# Patient Record
Sex: MALE | Race: WHITE | NOT HISPANIC OR LATINO
[De-identification: names, ages, dates, MRNs, and addresses within clinical notes are randomized per-mention and may not be internally consistent; named-entity substitution may affect disease eponyms.]

---

## 2017-11-01 ENCOUNTER — TRANSCRIPTION ENCOUNTER (OUTPATIENT)
Age: 52
End: 2017-11-01

## 2018-06-26 ENCOUNTER — OUTPATIENT (OUTPATIENT)
Dept: OUTPATIENT SERVICES | Facility: HOSPITAL | Age: 53
LOS: 1 days | End: 2018-06-26

## 2018-09-10 PROBLEM — Z00.00 ENCOUNTER FOR PREVENTIVE HEALTH EXAMINATION: Status: ACTIVE | Noted: 2018-09-10

## 2018-09-14 ENCOUNTER — APPOINTMENT (OUTPATIENT)
Dept: CARDIOLOGY | Facility: CLINIC | Age: 53
End: 2018-09-14
Payer: MEDICARE

## 2018-09-14 VITALS
HEIGHT: 71 IN | DIASTOLIC BLOOD PRESSURE: 80 MMHG | OXYGEN SATURATION: 99 % | BODY MASS INDEX: 20.86 KG/M2 | HEART RATE: 62 BPM | WEIGHT: 149 LBS | SYSTOLIC BLOOD PRESSURE: 140 MMHG

## 2018-09-14 DIAGNOSIS — Z87.898 PERSONAL HISTORY OF OTHER SPECIFIED CONDITIONS: ICD-10-CM

## 2018-09-14 DIAGNOSIS — M62.830 MUSCLE SPASM OF BACK: ICD-10-CM

## 2018-09-14 DIAGNOSIS — Z80.1 FAMILY HISTORY OF MALIGNANT NEOPLASM OF TRACHEA, BRONCHUS AND LUNG: ICD-10-CM

## 2018-09-14 DIAGNOSIS — Z82.49 FAMILY HISTORY OF ISCHEMIC HEART DISEASE AND OTHER DISEASES OF THE CIRCULATORY SYSTEM: ICD-10-CM

## 2018-09-14 DIAGNOSIS — M79.605 PAIN IN LEFT LEG: ICD-10-CM

## 2018-09-14 DIAGNOSIS — F43.9 REACTION TO SEVERE STRESS, UNSPECIFIED: ICD-10-CM

## 2018-09-14 DIAGNOSIS — Z87.828 PERSONAL HISTORY OF OTHER (HEALED) PHYSICAL INJURY AND TRAUMA: ICD-10-CM

## 2018-09-14 DIAGNOSIS — M79.606 PAIN IN LEG, UNSPECIFIED: ICD-10-CM

## 2018-09-14 DIAGNOSIS — F17.200 NICOTINE DEPENDENCE, UNSPECIFIED, UNCOMPLICATED: ICD-10-CM

## 2018-09-14 PROCEDURE — 99203 OFFICE O/P NEW LOW 30 MIN: CPT

## 2018-09-14 RX ORDER — FENTANYL 50 UG/H
50 PATCH, EXTENDED RELEASE TRANSDERMAL
Refills: 0 | Status: ACTIVE | COMMUNITY

## 2018-10-22 ENCOUNTER — APPOINTMENT (OUTPATIENT)
Dept: CARDIOLOGY | Facility: CLINIC | Age: 53
End: 2018-10-22
Payer: MEDICARE

## 2018-10-22 DIAGNOSIS — Z82.49 FAMILY HISTORY OF ISCHEMIC HEART DISEASE AND OTHER DISEASES OF THE CIRCULATORY SYSTEM: ICD-10-CM

## 2018-10-22 DIAGNOSIS — Z83.438 FAMILY HISTORY OF OTHER DISORDER OF LIPOPROTEIN METABOLISM AND OTHER LIPIDEMIA: ICD-10-CM

## 2018-10-22 DIAGNOSIS — Z78.9 OTHER SPECIFIED HEALTH STATUS: ICD-10-CM

## 2018-10-22 DIAGNOSIS — Z86.39 PERSONAL HISTORY OF OTHER ENDOCRINE, NUTRITIONAL AND METABOLIC DISEASE: ICD-10-CM

## 2018-10-22 DIAGNOSIS — Z87.09 PERSONAL HISTORY OF OTHER DISEASES OF THE RESPIRATORY SYSTEM: ICD-10-CM

## 2018-10-22 DIAGNOSIS — Z86.69 PERSONAL HISTORY OF OTHER DISEASES OF THE NERVOUS SYSTEM AND SENSE ORGANS: ICD-10-CM

## 2018-10-22 PROCEDURE — 93015 CV STRESS TEST SUPVJ I&R: CPT

## 2018-10-22 PROCEDURE — 93979 VASCULAR STUDY: CPT

## 2018-10-22 PROCEDURE — 93306 TTE W/DOPPLER COMPLETE: CPT

## 2018-10-22 PROCEDURE — A9502: CPT

## 2018-10-22 PROCEDURE — 78452 HT MUSCLE IMAGE SPECT MULT: CPT

## 2018-10-22 PROCEDURE — 93880 EXTRACRANIAL BILAT STUDY: CPT

## 2018-10-22 RX ORDER — ALBUTEROL SULFATE 90 UG/1
108 (90 BASE) AEROSOL, METERED RESPIRATORY (INHALATION)
Refills: 0 | Status: ACTIVE | COMMUNITY

## 2018-10-22 RX ORDER — HYDROCODONE BITARTRATE AND ACETAMINOPHEN 10; 325 MG/1; MG/1
10-325 TABLET ORAL
Refills: 0 | Status: ACTIVE | COMMUNITY

## 2018-10-23 ENCOUNTER — APPOINTMENT (OUTPATIENT)
Dept: CARDIOLOGY | Facility: CLINIC | Age: 53
End: 2018-10-23

## 2018-11-02 ENCOUNTER — APPOINTMENT (OUTPATIENT)
Dept: CARDIOLOGY | Facility: CLINIC | Age: 53
End: 2018-11-02
Payer: MEDICARE

## 2018-11-02 VITALS
HEART RATE: 68 BPM | SYSTOLIC BLOOD PRESSURE: 126 MMHG | WEIGHT: 149 LBS | BODY MASS INDEX: 20.86 KG/M2 | DIASTOLIC BLOOD PRESSURE: 90 MMHG | HEIGHT: 71 IN | OXYGEN SATURATION: 98 %

## 2018-11-02 PROCEDURE — 99214 OFFICE O/P EST MOD 30 MIN: CPT

## 2019-04-16 ENCOUNTER — RX RENEWAL (OUTPATIENT)
Age: 54
End: 2019-04-16

## 2019-05-10 ENCOUNTER — APPOINTMENT (OUTPATIENT)
Dept: CARDIOLOGY | Facility: CLINIC | Age: 54
End: 2019-05-10
Payer: MEDICARE

## 2019-05-10 VITALS
HEIGHT: 71 IN | DIASTOLIC BLOOD PRESSURE: 80 MMHG | SYSTOLIC BLOOD PRESSURE: 122 MMHG | BODY MASS INDEX: 21.84 KG/M2 | HEART RATE: 61 BPM | WEIGHT: 156 LBS

## 2019-05-10 PROCEDURE — 99214 OFFICE O/P EST MOD 30 MIN: CPT

## 2019-05-10 RX ORDER — METOPROLOL SUCCINATE 100 MG/1
100 TABLET, EXTENDED RELEASE ORAL DAILY
Refills: 0 | Status: DISCONTINUED | COMMUNITY
End: 2019-05-10

## 2019-05-10 NOTE — REASON FOR VISIT
[Consultation] : a consultation regarding [FreeTextEntry2] : hx atypical chest pain, abnormal EKG, DUMONT, tobacco, HTN, dyslipidemia, ascending aortic aneurysm 4.5cm on echo  [FreeTextEntry1] : Leonid is a 53-year-old male with history of hypertension, dyslipidemia, tobacco addiction, anxiety, headache, chronic back pain, family history premature CAD, ascending aortic aneurysm 4.5cm on echocardiogram.\par \par Patient has recurrent back pain and atypical chest pain, sharp, moderate intensity, lasting several minutes with upper body activity.  Patient has dyspnea with moderate exertion.  Patient has occasional palpitations and dizziness.  Cardiovascular review of symptoms is negative for exertional chest pain,  syncope.  No PND or orthopnea leg edema.  No bleeding or black stool. No history of CAD, MI, CHF, TIA, CVA, diabetes, PVD, DVT, PE, arrhythmia, Afib.\par \par Patient is not exercising.  Patient is walking less 15 minutes without exertional chest pain.  Average home blood pressures areat BP goal <130/80.\par \par Patient change to smoke 4 cigarettes per day. Smoking cessation has been discussed at length, patient will make an effort to reduce smoking and quit.\par \par Patient has significant stress at home taking care of his 28 year old son with autism and is elderly in-laws living down the street from his home. \par \par Lexascan Myoview stress test, October 2018, LVEF 52%, normal perfusion, diaphragm attenuation seen, no ischemic EKG response, baseline sinus rhythm and assist, hypertensive at baseline with continued hypertension and facial flushing. Norvasc 5mg daily added  with followup home BP. \par \par 2-D echo October 2018, LVEF 60%, mild MR, sigmoid septum 1.5cm, ascending aorta 4.5cm\par \par Carotid and abdominal ultrasound October 2018, mild to moderate nonobstructive plaque, normal abdominal aortic size\par \par EKG 6/6/18 done by [facility]D, sinus rhythm RSR V1 T-wave abnormality inferior and anteroseptal leads.

## 2019-05-10 NOTE — REVIEW OF SYSTEMS
[Dyspnea on exertion] : dyspnea during exertion [Shortness Of Breath] : shortness of breath [Heartburn] : heartburn [Joint Pain] : joint pain [Negative] : Heme/Lymph [Chest  Pressure] : no chest pressure [Lower Ext Edema] : no extremity edema [Chest Pain] : no chest pain [Palpitations] : no palpitations [Leg Claudication] : no intermittent leg claudication [FreeTextEntry1] : chronic low back pain limited exercise capacity

## 2019-05-10 NOTE — DISCUSSION/SUMMARY
[FreeTextEntry1] : Leonid is a 53-year-old male with medical history detailed above and active medical issues including:\par \par - Atypical chest pain, normal perfusion Myoview stress test, normal LVEF 2018\par \par - Ascending aortic aneurysm 4.5cm, contrast CTA has been ordered with current labs\par \par - Labile hypertension with anxiety on Vasotec, Toprol and norvasc with average home blood pressure at goal <130/80. \par \par - Dyslipidemia, repeat labs pending on low fat diet\par \par - Tobacco addiction. Smoking cessation has been discussed at length, patient will make an effort to reduce smoking and quit.\par \par - Chronic low back pain limiting exercise capacity.  Pain management epidural therapy\par \par - Family history of premature CAD, mother  age 62 MI\par \par Patient will be seen in cardiology follow-up 6 months with 2-D echocardiogram to assess for  LV systolic function, wall motion and  structural heart disease. Current cardiac medications remain unchanged. \par \par Advised patient to follow active lifestyle with regular cardiovascular exercise. Patient educated on lifestyle and diet modification with low sodium low fat diet and avoidance of excessive alcohol. Patient is aware to call with any symptoms or concerns. Smoking cessation has been discussed at length, patient will make an effort to reduce smoking and quit.\par \par Leonid will followup with Dr Darren Salter for primary care\par \par

## 2019-07-10 ENCOUNTER — OUTPATIENT (OUTPATIENT)
Dept: OUTPATIENT SERVICES | Facility: HOSPITAL | Age: 54
LOS: 1 days | End: 2019-07-10

## 2019-10-16 ENCOUNTER — APPOINTMENT (OUTPATIENT)
Dept: CARDIOLOGY | Facility: CLINIC | Age: 54
End: 2019-10-16

## 2019-11-05 ENCOUNTER — APPOINTMENT (OUTPATIENT)
Dept: CARDIOLOGY | Facility: CLINIC | Age: 54
End: 2019-11-05

## 2019-12-23 ENCOUNTER — RX RENEWAL (OUTPATIENT)
Age: 54
End: 2019-12-23

## 2019-12-23 ENCOUNTER — MEDICATION RENEWAL (OUTPATIENT)
Age: 54
End: 2019-12-23

## 2020-01-24 ENCOUNTER — NON-APPOINTMENT (OUTPATIENT)
Age: 55
End: 2020-01-24

## 2020-01-24 ENCOUNTER — APPOINTMENT (OUTPATIENT)
Dept: CARDIOLOGY | Facility: CLINIC | Age: 55
End: 2020-01-24
Payer: MEDICARE

## 2020-01-24 VITALS
BODY MASS INDEX: 21.7 KG/M2 | OXYGEN SATURATION: 98 % | HEIGHT: 71 IN | SYSTOLIC BLOOD PRESSURE: 112 MMHG | WEIGHT: 155 LBS | HEART RATE: 61 BPM | DIASTOLIC BLOOD PRESSURE: 66 MMHG

## 2020-01-24 DIAGNOSIS — R20.2 ANESTHESIA OF SKIN: ICD-10-CM

## 2020-01-24 DIAGNOSIS — R20.0 ANESTHESIA OF SKIN: ICD-10-CM

## 2020-01-24 PROCEDURE — 93000 ELECTROCARDIOGRAM COMPLETE: CPT

## 2020-01-24 PROCEDURE — 99214 OFFICE O/P EST MOD 30 MIN: CPT

## 2020-01-24 RX ORDER — ESOMEPRAZOLE MAGNESIUM 20 MG/1
20 CAPSULE, DELAYED RELEASE ORAL
Refills: 0 | Status: DISCONTINUED | COMMUNITY
End: 2020-01-24

## 2020-01-24 NOTE — REVIEW OF SYSTEMS
[Shortness Of Breath] : shortness of breath [Dyspnea on exertion] : dyspnea during exertion [Heartburn] : heartburn [Joint Pain] : joint pain [Negative] : Endocrine [Chest  Pressure] : no chest pressure [Chest Pain] : no chest pain [Leg Claudication] : no intermittent leg claudication [Lower Ext Edema] : no extremity edema [FreeTextEntry1] : chronic low back pain limited exercise capacity [Palpitations] : no palpitations

## 2020-01-24 NOTE — REASON FOR VISIT
[Consultation] : a consultation regarding [FreeTextEntry2] : atypical chest pain, abnormal EKG, DUMONT, tobacco, HTN, dyslipidemia, AsAA 4.5cm on echo normal CTA  [FreeTextEntry1] : Leonid is a 54-year-old male with history of hypertension, dyslipidemia, tobacco addiction, anxiety, headache, chronic back pain, family history premature CAD, ascending aortic aneurysm 4.5cm on echocardiogram with normal aorta on CTA.\par \par Patient has recurrent back pain and atypical chest pain, sharp, moderate intensity, lasting several minutes with upper body activity.  Patient has dyspnea with moderate exertion.  Patient has occasional palpitations and dizziness.  Cardiovascular review of symptoms is negative for exertional chest pain,  syncope.  No PND or orthopnea leg edema.  No bleeding or black stool. No history of CAD, MI, CHF, TIA, CVA, diabetes, PVD, DVT, PE, arrhythmia, Afib.\par \par Patient is not exercising.  Patient is walking less 15 minutes without exertional chest pain.  Average home blood pressures areat BP goal <130/80.\par \par Patient change to smoke 4 cigarettes per day. Smoking cessation has been discussed at length, patient will make an effort to reduce smoking and quit.\par \par Patient has significant stress at home taking care of his 28 year old son with autism and is elderly in-laws living down the street from his home. \par \par Lexascan Myoview stress test, October 2018, LVEF 52%, normal perfusion, diaphragm attenuation seen, no ischemic EKG response, baseline sinus rhythm and assist, hypertensive at baseline with continued hypertension and facial flushing. Norvasc 5mg daily added  with followup home BP. \par \par 2-D echo October 2018, LVEF 60%, mild MR, sigmoid septum 1.5cm, ascending aorta 4.5cm\par \par Contrast CTA chest May 2019, no evidence of aortic aneurysm\par \par Carotid and abdominal ultrasound October 2018, mild to moderate nonobstructive plaque, normal abdominal aortic size\par \par EKG 6/6/18 done by [facility], sinus rhythm RSR V1 T-wave abnormality inferior and anteroseptal leads.

## 2020-01-24 NOTE — DISCUSSION/SUMMARY
[FreeTextEntry1] : Leonid is a 53-year-old male with medical history detailed above and active medical issues including:\par \par - Atypical chest pain resolved, normal perfusion Myoview stress test, normal LVEF 2018\par \par - Ascending aortic aneurysm 4.5cm, contrast CTA chest May 2019 no evidence of aortic aneurysm\par \par - Labile hypertension with anxiety on Vasotec, Toprol and norvasc with average home blood pressure at goal <130/80. \par \par - Dyslipidemia, repeat labs pending on low fat diet\par \par - Tobacco addiction. Smoking cessation has been discussed at length, patient will make an effort to reduce smoking and quit.\par \par - Chronic low back pain limiting exercise capacity.  Pain management epidural therapy\par \par - Family history of premature CAD, mother  age 62 MI\par \par Patient will be seen in cardiology follow-up 6 months with 2-D echocardiogram to assess for  LV systolic function, wall motion and  structural heart disease.  Carotid and abdominal ultrasound to assess for obstructive PAD.  Current cardiac medications remain unchanged. \par \par Advised patient to follow active lifestyle with regular cardiovascular exercise. Patient educated on lifestyle and diet modification with low sodium low fat diet and avoidance of excessive alcohol. Patient is aware to call with any symptoms or concerns. Smoking cessation has been discussed at length, patient will make an effort to reduce smoking and quit.\par \par Leonid will followup with Renan Barron NP for primary care\par \par

## 2020-02-12 ENCOUNTER — APPOINTMENT (OUTPATIENT)
Dept: CARDIOLOGY | Facility: CLINIC | Age: 55
End: 2020-02-12
Payer: MEDICARE

## 2020-02-12 PROCEDURE — 93306 TTE W/DOPPLER COMPLETE: CPT

## 2020-08-28 ENCOUNTER — APPOINTMENT (OUTPATIENT)
Dept: CARDIOLOGY | Facility: CLINIC | Age: 55
End: 2020-08-28
Payer: MEDICARE

## 2020-08-28 VITALS
DIASTOLIC BLOOD PRESSURE: 62 MMHG | BODY MASS INDEX: 21.14 KG/M2 | HEART RATE: 61 BPM | SYSTOLIC BLOOD PRESSURE: 114 MMHG | HEIGHT: 71 IN | OXYGEN SATURATION: 96 % | WEIGHT: 151 LBS

## 2020-08-28 DIAGNOSIS — M62.838 OTHER MUSCLE SPASM: ICD-10-CM

## 2020-08-28 PROCEDURE — 99214 OFFICE O/P EST MOD 30 MIN: CPT

## 2020-08-28 NOTE — REASON FOR VISIT
[Consultation] : a consultation regarding [FreeTextEntry2] : atypical chest pain, abnormal EKG, DUMONT, tobacco, HTN, dyslipidemia, AsAA 4.5cm on echo normal CTA  [FreeTextEntry1] : Leonid is a 55-year-old male with history of hypertension, dyslipidemia, tobacco addiction, anxiety, headache, chronic back pain, family history premature CAD, ascending aortic aneurysm 4.5cm on echocardiogram with normal aorta on CTA.\par \par Patient has recurrent back pain and atypical chest pain, sharp, moderate intensity, lasting several minutes with upper body activity.  Patient has dyspnea with moderate exertion unchanged.  Patient has occasional palpitations and dizziness.  Cardiovascular review of symptoms is negative for exertional chest pain,  syncope.  No PND or orthopnea leg edema.  No bleeding or black stool. \par \par No history of CAD, MI, CHF, TIA, CVA, diabetes, PVD, DVT, PE, arrhythmia, Afib.\par \par Patient is not exercising.  Patient is walking less 15 minutes without exertional chest pain.  Average home blood pressures areat BP goal <130/80.\par \par Patient continues to smoke 4 cigarettes per day. Smoking cessation has been discussed at length, patient will make an effort to reduce smoking and quit.\par \par Patient has significant stress at home taking care of his 28 year old son with autism and is elderly in-laws living down the street from his home. \par \par Echocardiogram February 2020, LVEF 60%, mild MR, normal RVSP, ascending aorta 3.5 cm\par \par Lexascan Myoview stress test, October 2018, LVEF 52%, normal perfusion, diaphragm attenuation seen, no ischemic EKG response, baseline sinus rhythm and assist, hypertensive at baseline with continued hypertension and facial flushing. Norvasc 5mg daily added  with followup home BP. \par \par 2-D echo October 2018, LVEF 60%, mild MR, sigmoid septum 1.5cm, ascending aorta 4.5cm\par \par Contrast CTA chest May 2019, no evidence of aortic aneurysm\par \par Carotid and abdominal ultrasound October 2018, mild to moderate nonobstructive plaque, normal abdominal aortic size\par \par EKG 6/6/18 done by [facility], sinus rhythm RSR V1 T-wave abnormality inferior and anteroseptal leads.

## 2020-08-28 NOTE — DISCUSSION/SUMMARY
[FreeTextEntry1] : Leonid is a 55-year-old male with medical history detailed above and active medical issues including:\par \par - Atypical chest pain resolved, normal perfusion Myoview stress test, normal LVEF 2018.  In the setting of Covid 19 pandemic we will discuss the need for stress testing next office visit.\par \par - Ascending aortic aneurysm 4.5cm, contrast CTA chest May 2019 no evidence of aortic aneurysm\par \par - Labile hypertension with anxiety on Vasotec, Toprol and norvasc with average home blood pressure at goal <130/80. \par \par - Dyslipidemia, repeat labs pending on low fat diet\par \par - Tobacco addiction. Smoking cessation has been discussed at length, patient will make an effort to reduce smoking and quit.\par \par - Chronic low back pain limiting exercise capacity.  Pain management epidural therapy\par \par - Family history of premature CAD, mother  age 62 MI\par \par Patient will be seen in cardiology follow-up 6 months with 2-D echocardiogram to assess for  LV systolic function, wall motion and  structural heart disease.  Carotid and abdominal ultrasound to assess for obstructive PAD.  Current cardiac medications remain unchanged. \par \par Advised patient to follow active lifestyle with regular cardiovascular exercise. Patient educated on lifestyle and diet modification with low sodium low fat diet and avoidance of excessive alcohol. Patient is aware to call with any symptoms or concerns. Smoking cessation has been discussed at length, patient will make an effort to reduce smoking and quit.\par \par Leonid will followup with Renan Barron NP for primary care\par \par

## 2020-08-28 NOTE — REVIEW OF SYSTEMS
[Shortness Of Breath] : shortness of breath [Dyspnea on exertion] : dyspnea during exertion [Heartburn] : heartburn [Joint Pain] : joint pain [Negative] : Heme/Lymph [Chest  Pressure] : no chest pressure [Chest Pain] : no chest pain [Lower Ext Edema] : no extremity edema [Leg Claudication] : no intermittent leg claudication [Palpitations] : no palpitations [FreeTextEntry1] : chronic low back pain limited exercise capacity

## 2020-10-12 ENCOUNTER — RX RENEWAL (OUTPATIENT)
Age: 55
End: 2020-10-12

## 2021-02-10 ENCOUNTER — APPOINTMENT (OUTPATIENT)
Dept: CARDIOLOGY | Facility: CLINIC | Age: 56
End: 2021-02-10
Payer: MEDICARE

## 2021-02-10 PROCEDURE — 93306 TTE W/DOPPLER COMPLETE: CPT

## 2021-04-07 ENCOUNTER — RX RENEWAL (OUTPATIENT)
Age: 56
End: 2021-04-07

## 2021-10-31 ENCOUNTER — RX RENEWAL (OUTPATIENT)
Age: 56
End: 2021-10-31

## 2021-11-10 ENCOUNTER — NON-APPOINTMENT (OUTPATIENT)
Age: 56
End: 2021-11-10

## 2021-11-10 ENCOUNTER — APPOINTMENT (OUTPATIENT)
Dept: CARDIOLOGY | Facility: CLINIC | Age: 56
End: 2021-11-10
Payer: MEDICARE

## 2021-11-10 VITALS
TEMPERATURE: 97.4 F | DIASTOLIC BLOOD PRESSURE: 60 MMHG | HEIGHT: 71 IN | HEART RATE: 65 BPM | BODY MASS INDEX: 21 KG/M2 | OXYGEN SATURATION: 95 % | WEIGHT: 150 LBS | SYSTOLIC BLOOD PRESSURE: 114 MMHG

## 2021-11-10 PROCEDURE — 99214 OFFICE O/P EST MOD 30 MIN: CPT

## 2021-11-10 NOTE — REASON FOR VISIT
[Consultation] : a consultation regarding [FreeTextEntry1] : Leonid is a 56-year-old male with history of hypertension, dyslipidemia, tobacco addiction, anxiety, headache, chronic back pain, family history premature CAD, ascending aortic aneurysm 4.5cm on echocardiogram with normal aorta on CTA.\par \par Patient has recurrent back pain and atypical chest pain, sharp, moderate intensity, lasting several minutes with upper body activity.  Patient has dyspnea with moderate exertion unchanged.  Patient has occasional palpitations and dizziness.  Cardiovascular review of symptoms is negative for exertional chest pain,  syncope.  No PND or orthopnea leg edema.  No bleeding or black stool. \par \par No history of CAD, MI, CHF, TIA, CVA, diabetes, PVD, DVT, PE, arrhythmia, Afib.\par \par Patient is not exercising.  Patient is walking less 15 minutes without exertional chest pain.  Average home blood pressures areat BP goal <130/80.\par \par Patient continues to smoke 4 cigarettes per day. Smoking cessation has been discussed at length, patient will make an effort to reduce smoking and quit.\par \par Patient has significant stress at home taking care of his 28 year old son with autism and is elderly in-laws living down the street from his home. \par \par Echocardiogram February 2020, LVEF 60%, mild MR, normal RVSP, ascending aorta 3.5 cm\par \par Lexascan Myoview stress test, October 2018, LVEF 52%, normal perfusion, diaphragm attenuation seen, no ischemic EKG response, baseline sinus rhythm and assist, hypertensive at baseline with continued hypertension and facial flushing. Norvasc 5mg daily added  with followup home BP. \par \par 2-D echo October 2018, LVEF 60%, mild MR, sigmoid septum 1.5cm, ascending aorta 4.5cm\par \par Contrast CTA chest May 2019, no evidence of aortic aneurysm\par \par Carotid and abdominal ultrasound October 2018, mild to moderate nonobstructive plaque, normal abdominal aortic size\par \par EKG 6/6/18 done by [facility], sinus rhythm RSR V1 T-wave abnormality inferior and anteroseptal leads.  [FreeTextEntry2] : atypical chest pain, abnormal EKG, DUMONT, tobacco, HTN, dyslipidemia, AsAA 4.5cm on echo normal CTA

## 2021-11-10 NOTE — DISCUSSION/SUMMARY
[FreeTextEntry1] : Leonid is a 55-year-old male with medical history detailed above and active medical issues including:\par \par - Atypical chest pain resolved, normal perfusion Myoview stress test, normal LVEF 2018.  Patient declines performing current stress test.  Coronary calcium score ordered for a stratification.\par \par - Ascending aortic aneurysm 4.5cm, contrast CTA chest May 2019 no evidence of aortic aneurysm\par \par - Labile hypertension with anxiety on Vasotec, Toprol and norvasc with average home blood pressure at goal <130/80. \par \par - Dyslipidemia, repeat labs pending on low fat diet\par \par - Tobacco addiction. Smoking cessation has been discussed at length, patient will make an effort to reduce smoking and quit.\par \par - Chronic low back pain limiting exercise capacity.  Pain management epidural therapy\par \par - Family history of premature CAD, mother  age 62 MI\par \par Patient will be seen in cardiology follow-up 6 months with 2-D echocardiogram to assess for  LV systolic function, wall motion and  structural heart disease.  Carotid and abdominal ultrasound to assess for obstructive PAD.  Current cardiac medications remain unchanged. \par \par Advised patient to follow active lifestyle with regular cardiovascular exercise. Patient educated on lifestyle and diet modification with low sodium low fat diet and avoidance of excessive alcohol. Patient is aware to call with any symptoms or concerns. Smoking cessation has been discussed at length, patient will make an effort to reduce smoking and quit.\par \par Leonid will followup with Renan Barron NP for primary care\par \par

## 2022-05-17 ENCOUNTER — APPOINTMENT (OUTPATIENT)
Dept: CARDIOLOGY | Facility: CLINIC | Age: 57
End: 2022-05-17

## 2022-11-18 ENCOUNTER — RX RENEWAL (OUTPATIENT)
Age: 57
End: 2022-11-18

## 2022-11-28 ENCOUNTER — RESULT CHARGE (OUTPATIENT)
Age: 57
End: 2022-11-28

## 2022-11-29 ENCOUNTER — NON-APPOINTMENT (OUTPATIENT)
Age: 57
End: 2022-11-29

## 2022-11-29 ENCOUNTER — APPOINTMENT (OUTPATIENT)
Dept: CARDIOLOGY | Facility: CLINIC | Age: 57
End: 2022-11-29

## 2022-11-29 VITALS
HEART RATE: 66 BPM | BODY MASS INDEX: 20.72 KG/M2 | OXYGEN SATURATION: 97 % | DIASTOLIC BLOOD PRESSURE: 70 MMHG | TEMPERATURE: 97.3 F | SYSTOLIC BLOOD PRESSURE: 100 MMHG | HEIGHT: 71 IN | WEIGHT: 148 LBS

## 2022-11-29 PROCEDURE — 99215 OFFICE O/P EST HI 40 MIN: CPT

## 2022-11-29 RX ORDER — ENALAPRIL MALEATE 20 MG/1
20 TABLET ORAL
Qty: 180 | Refills: 1 | Status: COMPLETED | COMMUNITY
Start: 2021-04-07 | End: 2022-11-29

## 2022-11-29 RX ORDER — AMLODIPINE BESYLATE 5 MG/1
5 TABLET ORAL
Qty: 30 | Refills: 0 | Status: DISCONTINUED | COMMUNITY
Start: 2018-10-22 | End: 2022-11-29

## 2022-11-29 NOTE — REASON FOR VISIT
[Consultation] : a consultation regarding [FreeTextEntry1] : Leonid is a 57-year-old male with history of hypertension, dyslipidemia, tobacco addiction 4cigs/d, anxiety, headache, chronic back pain, family history premature CAD, ascending aortic aneurysm 4.5cm on echocardiogram with normal aorta on CTA.\par \par Patient has recurrent back pain and atypical chest pain, sharp, moderate intensity, lasting several minutes with upper body activity.  Patient has dyspnea with moderate exertion unchanged.  Patient has occasional palpitations and dizziness.  Cardiovascular review of symptoms is negative for exertional chest pain,  syncope.  No PND or orthopnea leg edema.  No bleeding or black stool. \par \par No history of CAD, MI, CHF, TIA, CVA, diabetes, PVD, DVT, PE, arrhythmia, AF.\par \par Patient is not exercising.  Patient is walking less 15 minutes without exertional chest pain.  Average home blood pressures areat BP goal <130/80.\par \par Patient continues to smoke 4 cigarettes per day. Smoking cessation has been discussed at length, patient will make an effort to reduce smoking and quit.\par \par Patient has significant stress at home taking care of his 28 year old son with autism and is elderly in-laws living down the street from his home. \par \par Echocardiogram February 2020, LVEF 60%, mild MR, normal RVSP, ascending aorta 3.5 cm\par \par Lexascan Myoview stress test, October 2018, LVEF 52%, normal perfusion, diaphragm attenuation seen, no ischemic EKG response, baseline sinus rhythm and assist, hypertensive at baseline with continued hypertension and facial flushing. Norvasc 5mg daily added  with followup home BP. \par \par 2-D echo October 2018, LVEF 60%, mild MR, sigmoid septum 1.5cm, ascending aorta 4.5cm\par \par Contrast CTA chest May 2019, no evidence of aortic aneurysm\par \par Carotid and abdominal ultrasound October 2018, mild to moderate nonobstructive plaque, normal abdominal aortic size\par \par EKG 6/6/18 done by [facility], sinus rhythm RSR V1 T-wave abnormality inferior and anteroseptal leads.  [FreeTextEntry2] : atypical chest pain, abnormal EKG, DUMONT, tobacco, HTN, dyslipidemia, AsAA 4.5cm on echo normal CTA

## 2022-11-29 NOTE — REVIEW OF SYSTEMS
[Negative] : Heme/Lymph [Dyspnea on exertion] : dyspnea during exertion [FreeTextEntry9] : Chronic low back pain

## 2022-11-29 NOTE — DISCUSSION/SUMMARY
[FreeTextEntry1] : Leonid is a 57-year-old male with medical history detailed above and active medical issues including:\par \par - Dyspnea on exertion, atypical chest pain resolved.  Coronary CTA and coronary calcium score ordered to access for obstructive CAD and risk stratification.  Echocardiogram ordered to evaluate for structural heart disease, carotid and abdominal ultrasound to evaluate for PAD.\par \par - Ascending aortic aneurysm 4.5cm, contrast CTA chest May 2019 no evidence of aortic aneurysm\par \par - Hypertension, average resting home BPs below guideline goal on atenolol 100 Mg daily, amlodipine 5 Mg daily, currently off enalapril.  Reduce amlodipine to 2.5 Mg daily with follow-up home BPs. \par \par - Dyslipidemia, repeat labs pending on low fat diet\par \par - Tobacco addiction. Smoking cessation has been discussed at length, patient will make an effort to reduce smoking and quit.\par \par - Chronic low back pain limiting exercise capacity.  Pain management epidural therapy Dr. Chacon\par \par - Family history of premature CAD, mother  age 62 MI\par \par Patient will be seen in cardiology follow-up 6 months with 2-D echocardiogram to assess for  LV systolic function, wall motion and  structural heart disease.  Carotid and abdominal ultrasound to assess for obstructive PAD.  Current cardiac medications remain unchanged. \par \par Advised patient to follow active lifestyle with regular cardiovascular exercise. Patient educated on lifestyle and diet modification with low sodium low fat diet and avoidance of excessive alcohol. Patient is aware to call with any symptoms or concerns. Smoking cessation has been discussed at length, patient will make an effort to reduce smoking and quit.\par \par Leonid will followup with Dr Carol Paredes for primary care\par \par

## 2023-01-31 ENCOUNTER — APPOINTMENT (OUTPATIENT)
Dept: CARDIOLOGY | Facility: CLINIC | Age: 58
End: 2023-01-31
Payer: MEDICARE

## 2023-01-31 PROCEDURE — 93880 EXTRACRANIAL BILAT STUDY: CPT

## 2023-01-31 PROCEDURE — 93979 VASCULAR STUDY: CPT

## 2023-01-31 PROCEDURE — 93306 TTE W/DOPPLER COMPLETE: CPT

## 2023-02-07 PROBLEM — R00.2 PALPITATIONS: Status: ACTIVE | Noted: 2018-09-14

## 2023-02-07 PROBLEM — R25.2 LEG CRAMPS: Status: ACTIVE | Noted: 2018-09-14

## 2023-02-14 ENCOUNTER — APPOINTMENT (OUTPATIENT)
Dept: CARDIOLOGY | Facility: CLINIC | Age: 58
End: 2023-02-14
Payer: MEDICARE

## 2023-02-14 VITALS
WEIGHT: 157 LBS | SYSTOLIC BLOOD PRESSURE: 120 MMHG | BODY MASS INDEX: 21.98 KG/M2 | HEART RATE: 60 BPM | DIASTOLIC BLOOD PRESSURE: 74 MMHG | HEIGHT: 71 IN | OXYGEN SATURATION: 97 % | TEMPERATURE: 98 F

## 2023-02-14 DIAGNOSIS — R00.2 PALPITATIONS: ICD-10-CM

## 2023-02-14 DIAGNOSIS — R25.2 CRAMP AND SPASM: ICD-10-CM

## 2023-02-14 PROCEDURE — 99215 OFFICE O/P EST HI 40 MIN: CPT

## 2023-02-14 NOTE — REASON FOR VISIT
[Consultation] : a consultation regarding [FreeTextEntry1] : Leonid is a 57-year-old male with history of hypertension, dyslipidemia, tobacco addiction 4cigs/d, anxiety, headache, chronic back pain, family history premature CAD, ascending aortic aneurysm 4.5cm on echocardiogram with normal aorta on CTA.\par \par Patient has recurrent back pain and atypical chest pain, sharp, moderate intensity, lasting several minutes with upper body activity.  Patient has dyspnea with moderate exertion unchanged.  Patient has occasional palpitations and dizziness.  Cardiovascular review of symptoms is negative for exertional chest pain,  syncope.  No PND or orthopnea leg edema.  No bleeding or black stool. \par \par No history of CAD, MI, CHF, TIA, CVA, diabetes, PVD, DVT, PE, arrhythmia, AF.\par \par Patient is not exercising.  Patient is walking less 15 minutes without exertional chest pain. \par \par Patient continues to smoke 1/4 PPD. Smoking cessation has been discussed at length, patient will make an effort to reduce smoking and quit.\par \par Patient has significant stress at home taking care of his 28 year old son with autism and is elderly in-laws living down the street from his home.\par \par Labs Feb 2023, normal CBC, BMP, LFT, TSH, HbA1c, total cholesterol 222, HDL 50, , triglyceride 88\par \par Echocardiogram Jan LVEF 55 to 60%, mild MR.\par \par Carotid and abdominal ultrasound Jan 2023, mild nonobstructive plaque, normal abdominal aortic size. \par \par Echocardiogram February 2020, LVEF 60%, mild MR, normal RVSP, ascending aorta 3.5 cm\par \par Lexascan Myoview stress test, October 2018, LVEF 52%, normal perfusion, diaphragm attenuation seen, no ischemic EKG response, baseline sinus rhythm and assist, hypertensive at baseline with continued hypertension and facial flushing. Norvasc 5mg daily added  with followup home BP. \par \par 2-D echo October 2018, LVEF 60%, mild MR, sigmoid septum 1.5cm, ascending aorta 4.5cm\par \par Contrast CTA chest May 2019, no evidence of aortic aneurysm\par \par Carotid and abdominal ultrasound October 2018, mild to moderate nonobstructive plaque, normal abdominal aortic size\par \par EKG 6/6/18 done by [facility], sinus rhythm RSR V1 T-wave abnormality inferior and anteroseptal leads.  [FreeTextEntry2] : atypical chest pain, abnormal EKG, DUMONT, tobacco, HTN, dyslipidemia, AsAA 4.5cm on echo normal CTA

## 2023-02-14 NOTE — REVIEW OF SYSTEMS
[Dyspnea on exertion] : dyspnea during exertion [Negative] : Heme/Lymph [FreeTextEntry9] : Chronic low back pain no chest pain/no palpitations/no dyspnea on exertion/no orthopnea/no paroxysmal nocturnal dyspnea/no peripheral edema/no claudication

## 2023-02-14 NOTE — DISCUSSION/SUMMARY
[FreeTextEntry1] : Leonid is a 57-year-old male with medical history detailed above and active medical issues including:\par \par - Dyspnea on exertion, atypical chest pain resolved.  Coronary CTA and coronary calcium score ordered to access for obstructive CAD and risk stratification.  \par \par - Ascending aortic aneurysm 4.5cm, contrast CTA chest May 2019 no evidence of aortic aneurysm\par \par - Hypertension, average resting home BPs at guideline goal on atenolol, amlodipine.\par \par - Dyslipidemia, patient does not wish to start statin therapy repeat labs will be ordered for 3 months on low-fat diet\par \par - Tobacco addiction  PPD. Smoking cessation has been discussed at length, patient will make an effort to reduce smoking and quit.\par \par - Chronic low back pain limiting exercise capacity.  Pain management epidural therapy Dr. Chacon\par \par - Family history of premature CAD, mother  age 62 MI\par \par Advised patient to follow active lifestyle with regular cardiovascular exercise. Patient educated on lifestyle and diet modification with low sodium low fat diet and avoidance of excessive alcohol. Patient is aware to call with any symptoms or concerns. Smoking cessation has been discussed at length, patient will make an effort to reduce smoking and quit.\par \par Cardiology follow-up 6 months.  Current cardiac medications remain unchanged and renewals  are up to date. Repeat labs will be ordered with PMD.\par \par Leonid will followup with Dr Carol Paredes for primary care\par \par

## 2023-08-14 ENCOUNTER — RX RENEWAL (OUTPATIENT)
Age: 58
End: 2023-08-14

## 2023-09-05 PROBLEM — M53.9 BACK PROBLEM: Status: ACTIVE | Noted: 2018-09-14

## 2023-09-05 NOTE — REVIEW OF SYSTEMS
[Dyspnea on exertion] : dyspnea during exertion [Negative] : Heme/Lymph [FreeTextEntry9] : Chronic low back pain

## 2023-09-05 NOTE — REASON FOR VISIT
[FreeTextEntry1] : Leonid is a 57-year-old male with history of hypertension, dyslipidemia, tobacco addiction 4cigs/d, anxiety, headache, chronic back pain, family history premature CAD, ascending aortic aneurysm 4.5cm on echocardiogram with normal aorta on CTA.\par  \par  Patient has recurrent back pain and atypical chest pain, sharp, moderate intensity, lasting several minutes with upper body activity.  Patient has dyspnea with moderate exertion unchanged.  Patient has occasional palpitations and dizziness.  Cardiovascular review of symptoms is negative for exertional chest pain,  syncope.  No PND or orthopnea leg edema.  No bleeding or black stool. \par  \par  No history of CAD, MI, CHF, TIA, CVA, diabetes, PVD, DVT, PE, arrhythmia, AF.\par  \par  Patient is not exercising.  Patient is walking less 15 minutes without exertional chest pain. \par  \par  Patient continues to smoke 1/4 PPD. Smoking cessation has been discussed at length, patient will make an effort to reduce smoking and quit.\par  \par  Patient has significant stress at home taking care of his 28 year old son with autism and is elderly in-laws living down the street from his home.\par  \par  Labs Feb 2023, normal CBC, BMP, LFT, TSH, HbA1c, total cholesterol 222, HDL 50, , triglyceride 88\par  \par  Echocardiogram Jan LVEF 55 to 60%, mild MR.\par  \par  Carotid and abdominal ultrasound Jan 2023, mild nonobstructive plaque, normal abdominal aortic size. \par  \par  Echocardiogram February 2020, LVEF 60%, mild MR, normal RVSP, ascending aorta 3.5 cm\par  \par  Lexascan Myoview stress test, October 2018, LVEF 52%, normal perfusion, diaphragm attenuation seen, no ischemic EKG response, baseline sinus rhythm and assist, hypertensive at baseline with continued hypertension and facial flushing. Norvasc 5mg daily added  with followup home BP. \par  \par  2-D echo October 2018, LVEF 60%, mild MR, sigmoid septum 1.5cm, ascending aorta 4.5cm\par  \par  Contrast CTA chest May 2019, no evidence of aortic aneurysm\par  \par  Carotid and abdominal ultrasound October 2018, mild to moderate nonobstructive plaque, normal abdominal aortic size\par  \par  EKG 6/6/18 done by PMD, sinus rhythm RSR V1 T-wave abnormality inferior and anteroseptal leads.  [Consultation] : a consultation regarding [FreeTextEntry2] : atypical chest pain, abnormal EKG, DUMONT, tobacco, HTN, dyslipidemia, AsAA 4.5cm on echo normal CTA

## 2023-09-05 NOTE — PHYSICAL EXAM
[Well Developed] : well developed [Well Nourished] : well nourished [No Acute Distress] : no acute distress [Normal Conjunctiva] : normal conjunctiva [Normal Venous Pressure] : normal venous pressure [No Carotid Bruit] : no carotid bruit [Normal S1, S2] : normal S1, S2 [No Murmur] : no murmur [No Rub] : no rub [No Gallop] : no gallop [Clear Lung Fields] : clear lung fields [Good Air Entry] : good air entry [No Respiratory Distress] : no respiratory distress  [Soft] : abdomen soft [No Masses/organomegaly] : no masses/organomegaly [Non Tender] : non-tender [Normal Bowel Sounds] : normal bowel sounds [Normal Gait] : normal gait [No Edema] : no edema [No Cyanosis] : no cyanosis [No Varicosities] : no varicosities [No Clubbing] : no clubbing [No Rash] : no rash [No Skin Lesions] : no skin lesions [Moves all extremities] : moves all extremities [No Focal Deficits] : no focal deficits [Normal Speech] : normal speech [Alert and Oriented] : alert and oriented [Normal memory] : normal memory

## 2023-09-12 ENCOUNTER — APPOINTMENT (OUTPATIENT)
Dept: CARDIOLOGY | Facility: CLINIC | Age: 58
End: 2023-09-12
Payer: MEDICARE

## 2023-09-12 VITALS
BODY MASS INDEX: 20.3 KG/M2 | WEIGHT: 145 LBS | DIASTOLIC BLOOD PRESSURE: 68 MMHG | HEART RATE: 84 BPM | OXYGEN SATURATION: 99 % | SYSTOLIC BLOOD PRESSURE: 92 MMHG | HEIGHT: 71 IN

## 2023-09-12 DIAGNOSIS — M53.9 DORSOPATHY, UNSPECIFIED: ICD-10-CM

## 2023-09-12 PROCEDURE — 99214 OFFICE O/P EST MOD 30 MIN: CPT

## 2023-09-12 RX ORDER — AMLODIPINE BESYLATE 2.5 MG/1
2.5 TABLET ORAL
Qty: 90 | Refills: 2 | Status: DISCONTINUED | COMMUNITY
Start: 2022-11-29 | End: 2023-09-12

## 2023-10-06 ENCOUNTER — NON-APPOINTMENT (OUTPATIENT)
Age: 58
End: 2023-10-06

## 2023-10-13 ENCOUNTER — APPOINTMENT (OUTPATIENT)
Dept: CARDIOLOGY | Facility: CLINIC | Age: 58
End: 2023-10-13
Payer: MEDICARE

## 2023-10-13 VITALS
SYSTOLIC BLOOD PRESSURE: 170 MMHG | BODY MASS INDEX: 20.3 KG/M2 | OXYGEN SATURATION: 98 % | DIASTOLIC BLOOD PRESSURE: 98 MMHG | HEART RATE: 75 BPM | HEIGHT: 71 IN | WEIGHT: 145 LBS

## 2023-10-13 PROCEDURE — 93000 ELECTROCARDIOGRAM COMPLETE: CPT

## 2023-10-13 PROCEDURE — 99214 OFFICE O/P EST MOD 30 MIN: CPT

## 2023-10-13 RX ORDER — IBUPROFEN 600 MG/1
600 TABLET, FILM COATED ORAL
Refills: 0 | Status: DISCONTINUED | COMMUNITY
End: 2023-10-13

## 2023-10-13 RX ORDER — ATENOLOL 100 MG/1
100 TABLET ORAL DAILY
Qty: 90 | Refills: 3 | Status: DISCONTINUED | COMMUNITY
Start: 1900-01-01 | End: 2023-10-13

## 2023-10-24 ENCOUNTER — NON-APPOINTMENT (OUTPATIENT)
Age: 58
End: 2023-10-24

## 2023-10-24 RX ORDER — RIVAROXABAN 15 MG/1
15 TABLET, FILM COATED ORAL
Qty: 30 | Refills: 0 | Status: DISCONTINUED | COMMUNITY
Start: 2023-10-13 | End: 2023-10-24

## 2023-10-26 ENCOUNTER — APPOINTMENT (OUTPATIENT)
Dept: CARDIOLOGY | Facility: CLINIC | Age: 58
End: 2023-10-26
Payer: MEDICARE

## 2023-10-26 VITALS
BODY MASS INDEX: 20.02 KG/M2 | WEIGHT: 143 LBS | SYSTOLIC BLOOD PRESSURE: 142 MMHG | HEART RATE: 67 BPM | RESPIRATION RATE: 14 BRPM | DIASTOLIC BLOOD PRESSURE: 86 MMHG | HEIGHT: 71 IN | OXYGEN SATURATION: 98 %

## 2023-10-26 PROCEDURE — 93000 ELECTROCARDIOGRAM COMPLETE: CPT

## 2023-10-26 PROCEDURE — 99215 OFFICE O/P EST HI 40 MIN: CPT

## 2023-12-14 ENCOUNTER — APPOINTMENT (OUTPATIENT)
Dept: CARDIOLOGY | Facility: CLINIC | Age: 58
End: 2023-12-14
Payer: MEDICARE

## 2023-12-14 ENCOUNTER — NON-APPOINTMENT (OUTPATIENT)
Age: 58
End: 2023-12-14

## 2023-12-14 VITALS
OXYGEN SATURATION: 99 % | SYSTOLIC BLOOD PRESSURE: 144 MMHG | WEIGHT: 146 LBS | BODY MASS INDEX: 20.44 KG/M2 | DIASTOLIC BLOOD PRESSURE: 86 MMHG | HEIGHT: 71 IN | RESPIRATION RATE: 14 BRPM | HEART RATE: 62 BPM

## 2023-12-14 DIAGNOSIS — Z87.898 PERSONAL HISTORY OF OTHER SPECIFIED CONDITIONS: ICD-10-CM

## 2023-12-14 DIAGNOSIS — Z72.0 TOBACCO USE: ICD-10-CM

## 2023-12-14 PROCEDURE — 99214 OFFICE O/P EST MOD 30 MIN: CPT

## 2023-12-14 PROCEDURE — 93000 ELECTROCARDIOGRAM COMPLETE: CPT

## 2023-12-14 NOTE — REASON FOR VISIT
[FreeTextEntry1] : Leonid is a 58-year-old male with history of hypertension, dyslipidemia, tobacco addiction 4 cigs/d, anxiety, headache, chronic back pain on fentanyl patch, family history premature CAD, ascending aortic aneurysm 4.5cm on echocardiogram with normal aorta on CTA.  Pt was found to be in rapid Afib prior to CTA. HE went to the ER however signed out AMA prior to work up. c/o DUMONT after a few feet. Can feel his heart beating. He drinks 6-8 beers daily EKG done here October 13, 2023  - AFIB with . Pt refused to go to ER then EKG today shows A-fib with controlled ventricular rate Reviewed rhythm express recording monitor in detail along with A-fib and NSVT's.  No exercise routine. Patient is walking 10 minutes on occasion.  No history of CAD, MI, CHF, TIA, CVA, diabetes, PVD, DVT, PE, arrhythmia, AF.  Patient is not exercising. Patient is walking less 15 minutes without exertional chest pain.  Patient continues to smoke 1/4 PPD. Smoking cessation has been discussed at length, patient will make an effort to reduce smoking and quit.  Continues to drink more than 6-8 beers per day which makes him feel better.  No recent DTs.  Patient has significant stress at home taking care of his 28 year old son with autism and is elderly in-laws living down the street from his home.  He underwent ALEX guided cardioversion on 12/12/2023.  Successfully cardioverted with 200 J x 1.  Remains in sinus rhythm.  Feels much better.  Labs Feb 2023, normal CBC, BMP, LFT, TSH, HbA1c, total cholesterol 222, HDL 50, , triglyceride 88  Echocardiogram Jan 2023 LVEF 55 to 60%, mild MR.  Carotid and abdominal ultrasound Jan 2023, mild nonobstructive plaque, normal abdominal aortic size.  Echocardiogram February 2020, LVEF 60%, mild MR, normal RVSP, ascending aorta 3.5 cm  Lexascan Myoview stress test, October 2018, LVEF 52%, normal perfusion, diaphragm attenuation seen, no ischemic EKG response, baseline sinus rhythm and assist, hypertensive at baseline with continued hypertension and facial flushing. Norvasc 5mg daily added with followup home BP.  2-D echo October 2018, LVEF 60%, mild MR, sigmoid septum 1.5cm, ascending aorta 4.5cm  Contrast CTA chest May 2019, no evidence of aortic aneurysm  Carotid and abdominal ultrasound October 2018, mild to moderate nonobstructive plaque, normal abdominal aortic size

## 2023-12-14 NOTE — DISCUSSION/SUMMARY
[FreeTextEntry1] : Plan: Status post ALEX guided cardioversion.  Remains in sinus rhythm Ventricular rate is now controlled: He should finish his CTA of coronaries.  He has a requisition EP consultation Continue  current beta-blocker Add low-dose losartan for better blood pressure control Continue Xarelto 20 mg daily Continue follow-up with Dr. Valentino / [facility] MONROY after EP  Thank you for this referral and allowing me to participate in the care of this patient.  If I can be of any further help or  if you have any questions, please do not hesitate to contact me   Sincerely,  Kyle Benavides MD, FACC, LIA

## 2023-12-14 NOTE — PHYSICAL EXAM
[Normal] : clear lung fields, good air entry, no respiratory distress [No Edema] : no edema [de-identified] : S1-S2 normal and regular

## 2024-01-12 ENCOUNTER — APPOINTMENT (OUTPATIENT)
Dept: ELECTROPHYSIOLOGY | Facility: CLINIC | Age: 59
End: 2024-01-12

## 2024-02-27 PROBLEM — R51.9 HEADACHE: Status: ACTIVE | Noted: 2018-09-14

## 2024-02-27 PROBLEM — K21.9 GERD (GASTROESOPHAGEAL REFLUX DISEASE): Status: ACTIVE | Noted: 2018-09-14

## 2024-02-27 PROBLEM — M54.9 BACK PAIN: Status: ACTIVE | Noted: 2018-09-14

## 2024-03-05 ENCOUNTER — APPOINTMENT (OUTPATIENT)
Dept: CARDIOLOGY | Facility: CLINIC | Age: 59
End: 2024-03-05
Payer: MEDICARE

## 2024-03-05 VITALS
DIASTOLIC BLOOD PRESSURE: 60 MMHG | HEIGHT: 71 IN | WEIGHT: 155 LBS | SYSTOLIC BLOOD PRESSURE: 114 MMHG | BODY MASS INDEX: 21.7 KG/M2 | OXYGEN SATURATION: 100 % | HEART RATE: 62 BPM

## 2024-03-05 DIAGNOSIS — M54.9 DORSALGIA, UNSPECIFIED: ICD-10-CM

## 2024-03-05 DIAGNOSIS — R51.9 HEADACHE, UNSPECIFIED: ICD-10-CM

## 2024-03-05 DIAGNOSIS — K21.9 GASTRO-ESOPHAGEAL REFLUX DISEASE W/OUT ESOPHAGITIS: ICD-10-CM

## 2024-03-05 PROCEDURE — G2211 COMPLEX E/M VISIT ADD ON: CPT

## 2024-03-05 PROCEDURE — 99215 OFFICE O/P EST HI 40 MIN: CPT

## 2024-03-05 NOTE — REASON FOR VISIT
[Consultation] : a consultation regarding [FreeTextEntry2] : atypical chest pain, abnormal EKG, DUMONT, tobacco, HTN, dyslipidemia, AsAA 4.5cm on echo normal CTA  [FreeTextEntry1] : Leonid is a 58-year-old male with history of alcoholism 8 beers/day, AF ALEX [facility] 12/12/23, hypertension, dyslipidemia, tobacco addiction 4cigs/d, anxiety, headache, chronic back pain, family history premature CAD, ascending aortic aneurysm 4.5cm on echocardiogram with normal aorta on CTA.  Patient has recurrent back pain and atypical chest pain, sharp, moderate intensity, lasting several minutes with upper body activity.  Patient has dyspnea with moderate exertion unchanged.  No further palpitations or AF.  Cardiovascular review of symptoms is negative for exertional chest pain,  syncope.  No PND or orthopnea leg edema.  No bleeding or black stool.   No history of CAD, MI, CHF, TIA, CVA, diabetes, PVD, DVT, PE, arrhythmia, AF.  Patient is not exercising.  Patient is walking less 15 minutes without exertional chest pain.   Patient continues to smoke 1/4 PPD. Smoking cessation has been discussed at length, patient will make an effort to reduce smoking and quit.  Patient continues to drink 4-8 beers per day, discussed reducing and discontinuing alcohol.  Patient has significant stress at home taking care of his 28 year old son with autism and is elderly in-laws living down the street from his home.  ALEX 12/12/2023, LVEF 40-50%, severe LAE, mild-moderate TR, mild MR, ascending aorta 4.0 cm  Labs Feb 2023, normal CBC, BMP, LFT, TSH, HbA1c, total cholesterol 222, HDL 50, , triglyceride 88  Echocardiogram Jan LVEF 55 to 60%, mild MR.  Carotid and abdominal ultrasound Jan 2023, mild nonobstructive plaque, normal abdominal aortic size.   Echocardiogram February 2020, LVEF 60%, mild MR, normal RVSP, ascending aorta 3.5 cm  Lexascan Myoview stress test, October 2018, LVEF 52%, normal perfusion, diaphragm attenuation seen, no ischemic EKG response, baseline sinus rhythm and assist, hypertensive at baseline with continued hypertension and facial flushing. Norvasc 5mg daily added  with followup home BP.   2-D echo October 2018, LVEF 60%, mild MR, sigmoid septum 1.5cm, ascending aorta 4.5cm  Contrast CTA chest May 2019, no evidence of aortic aneurysm  Carotid and abdominal ultrasound October 2018, mild to moderate nonobstructive plaque, normal abdominal aortic size  EKG 6/6/18 done by PMD, sinus rhythm RSR V1 T-wave abnormality inferior and anteroseptal leads.

## 2024-05-06 RX ORDER — PANTOPRAZOLE 20 MG/1
20 TABLET, DELAYED RELEASE ORAL DAILY
Qty: 90 | Refills: 3 | Status: ACTIVE | COMMUNITY
Start: 2020-01-24 | End: 1900-01-01

## 2024-06-03 RX ORDER — METOPROLOL SUCCINATE 50 MG/1
50 TABLET, EXTENDED RELEASE ORAL
Qty: 180 | Refills: 3 | Status: ACTIVE | COMMUNITY
Start: 2023-10-13 | End: 1900-01-01

## 2024-06-03 RX ORDER — RIVAROXABAN 20 MG/1
20 TABLET, FILM COATED ORAL
Qty: 90 | Refills: 3 | Status: ACTIVE | COMMUNITY
Start: 2023-10-13 | End: 1900-01-01

## 2024-06-25 ENCOUNTER — APPOINTMENT (OUTPATIENT)
Dept: CARDIOLOGY | Facility: CLINIC | Age: 59
End: 2024-06-25
Payer: MEDICARE

## 2024-06-25 VITALS
HEART RATE: 72 BPM | HEIGHT: 71 IN | WEIGHT: 144 LBS | BODY MASS INDEX: 20.16 KG/M2 | DIASTOLIC BLOOD PRESSURE: 82 MMHG | SYSTOLIC BLOOD PRESSURE: 136 MMHG | OXYGEN SATURATION: 96 %

## 2024-06-25 DIAGNOSIS — R06.09 OTHER FORMS OF DYSPNEA: ICD-10-CM

## 2024-06-25 DIAGNOSIS — I48.91 UNSPECIFIED ATRIAL FIBRILLATION: ICD-10-CM

## 2024-06-25 DIAGNOSIS — E78.5 HYPERLIPIDEMIA, UNSPECIFIED: ICD-10-CM

## 2024-06-25 DIAGNOSIS — I71.21 ANEURYSM OF THE ASCENDING AORTA, WITHOUT RUPTURE: ICD-10-CM

## 2024-06-25 DIAGNOSIS — I10 ESSENTIAL (PRIMARY) HYPERTENSION: ICD-10-CM

## 2024-06-25 DIAGNOSIS — R07.9 CHEST PAIN, UNSPECIFIED: ICD-10-CM

## 2024-06-25 PROCEDURE — 93306 TTE W/DOPPLER COMPLETE: CPT

## 2024-06-25 PROCEDURE — 99214 OFFICE O/P EST MOD 30 MIN: CPT

## 2024-06-25 RX ORDER — LOSARTAN POTASSIUM 25 MG/1
25 TABLET, FILM COATED ORAL DAILY
Qty: 90 | Refills: 3 | Status: DISCONTINUED | COMMUNITY
Start: 2023-12-14 | End: 2024-06-25

## 2024-08-05 ENCOUNTER — RESULT REVIEW (OUTPATIENT)
Age: 59
End: 2024-08-05

## 2024-08-07 ENCOUNTER — APPOINTMENT (OUTPATIENT)
Dept: CARDIOLOGY | Facility: CLINIC | Age: 59
End: 2024-08-07

## 2024-08-27 ENCOUNTER — APPOINTMENT (OUTPATIENT)
Dept: CARDIOLOGY | Facility: CLINIC | Age: 59
End: 2024-08-27

## 2024-08-27 ENCOUNTER — NON-APPOINTMENT (OUTPATIENT)
Age: 59
End: 2024-08-27

## 2025-04-21 ENCOUNTER — RX RENEWAL (OUTPATIENT)
Age: 60
End: 2025-04-21

## 2025-05-19 ENCOUNTER — RX RENEWAL (OUTPATIENT)
Age: 60
End: 2025-05-19

## 2025-05-27 ENCOUNTER — NON-APPOINTMENT (OUTPATIENT)
Age: 60
End: 2025-05-27

## 2025-05-27 ENCOUNTER — APPOINTMENT (OUTPATIENT)
Dept: CARDIOLOGY | Facility: CLINIC | Age: 60
End: 2025-05-27
Payer: MEDICARE

## 2025-05-27 VITALS
OXYGEN SATURATION: 98 % | HEART RATE: 66 BPM | WEIGHT: 151 LBS | SYSTOLIC BLOOD PRESSURE: 146 MMHG | DIASTOLIC BLOOD PRESSURE: 86 MMHG | BODY MASS INDEX: 21.06 KG/M2

## 2025-05-27 DIAGNOSIS — R06.09 OTHER FORMS OF DYSPNEA: ICD-10-CM

## 2025-05-27 DIAGNOSIS — R07.9 CHEST PAIN, UNSPECIFIED: ICD-10-CM

## 2025-05-27 DIAGNOSIS — M54.9 DORSALGIA, UNSPECIFIED: ICD-10-CM

## 2025-05-27 DIAGNOSIS — R51.9 HEADACHE, UNSPECIFIED: ICD-10-CM

## 2025-05-27 DIAGNOSIS — R25.2 CRAMP AND SPASM: ICD-10-CM

## 2025-05-27 DIAGNOSIS — I71.21 ANEURYSM OF THE ASCENDING AORTA, WITHOUT RUPTURE: ICD-10-CM

## 2025-05-27 DIAGNOSIS — E78.5 HYPERLIPIDEMIA, UNSPECIFIED: ICD-10-CM

## 2025-05-27 PROCEDURE — 99215 OFFICE O/P EST HI 40 MIN: CPT

## 2025-05-28 DIAGNOSIS — I10 ESSENTIAL (PRIMARY) HYPERTENSION: ICD-10-CM

## 2025-05-28 DIAGNOSIS — I48.91 UNSPECIFIED ATRIAL FIBRILLATION: ICD-10-CM

## 2025-05-28 RX ORDER — LOSARTAN POTASSIUM 25 MG/1
25 TABLET, FILM COATED ORAL DAILY
Qty: 90 | Refills: 0 | Status: ACTIVE | COMMUNITY
Start: 1900-01-01 | End: 1900-01-01

## 2025-08-06 ENCOUNTER — APPOINTMENT (OUTPATIENT)
Dept: CARDIOLOGY | Facility: CLINIC | Age: 60
End: 2025-08-06

## 2025-08-06 DIAGNOSIS — I10 ESSENTIAL (PRIMARY) HYPERTENSION: ICD-10-CM

## 2025-08-06 DIAGNOSIS — M53.9 DORSOPATHY, UNSPECIFIED: ICD-10-CM

## 2025-08-06 DIAGNOSIS — R00.2 PALPITATIONS: ICD-10-CM

## 2025-08-06 DIAGNOSIS — K21.9 GASTRO-ESOPHAGEAL REFLUX DISEASE W/OUT ESOPHAGITIS: ICD-10-CM

## 2025-08-06 DIAGNOSIS — R07.9 CHEST PAIN, UNSPECIFIED: ICD-10-CM

## 2025-08-06 DIAGNOSIS — R51.9 HEADACHE, UNSPECIFIED: ICD-10-CM

## 2025-08-06 DIAGNOSIS — Z72.0 TOBACCO USE: ICD-10-CM

## 2025-08-06 DIAGNOSIS — I48.91 UNSPECIFIED ATRIAL FIBRILLATION: ICD-10-CM

## 2025-08-06 DIAGNOSIS — M62.830 MUSCLE SPASM OF BACK: ICD-10-CM
